# Patient Record
Sex: MALE | Race: WHITE | Employment: FULL TIME | URBAN - NONMETROPOLITAN AREA
[De-identification: names, ages, dates, MRNs, and addresses within clinical notes are randomized per-mention and may not be internally consistent; named-entity substitution may affect disease eponyms.]

---
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
## 2022-04-15 ENCOUNTER — TELEPHONE (OUTPATIENT)
Dept: BEHAVIORAL/MENTAL HEALTH | Age: 22
End: 2022-04-15

## 2022-05-26 ENCOUNTER — HOSPITAL ENCOUNTER (EMERGENCY)
Age: 22
Discharge: HOME | End: 2022-05-27
Attending: EMERGENCY MEDICINE
Payer: COMMERCIAL

## 2022-05-26 DIAGNOSIS — F41.0 ANXIETY ATTACK: Primary | ICD-10-CM

## 2022-05-26 DIAGNOSIS — R55 SYNCOPE, UNSPECIFIED SYNCOPE TYPE: ICD-10-CM

## 2022-05-26 PROCEDURE — 93005 ELECTROCARDIOGRAM TRACING: CPT | Performed by: EMERGENCY MEDICINE

## 2022-05-26 PROCEDURE — 6370000000 HC RX 637 (ALT 250 FOR IP): Performed by: EMERGENCY MEDICINE

## 2022-05-26 PROCEDURE — 99283 EMERGENCY DEPT VISIT LOW MDM: CPT

## 2022-05-26 RX ORDER — HYDROXYZINE PAMOATE 25 MG/1
25 CAPSULE ORAL 3 TIMES DAILY PRN
Qty: 15 CAPSULE | Refills: 0 | Status: ON HOLD | OUTPATIENT
Start: 2022-05-26 | End: 2022-08-08 | Stop reason: HOSPADM

## 2022-05-26 RX ORDER — LORAZEPAM 1 MG/1
1 TABLET ORAL ONCE
Status: COMPLETED | OUTPATIENT
Start: 2022-05-27 | End: 2022-05-26

## 2022-05-26 RX ADMIN — LORAZEPAM 1 MG: 1 TABLET ORAL at 23:56

## 2022-05-26 ASSESSMENT — PAIN DESCRIPTION - DESCRIPTORS: DESCRIPTORS: ACHING

## 2022-05-26 ASSESSMENT — PAIN DESCRIPTION - ORIENTATION: ORIENTATION: LEFT

## 2022-05-26 ASSESSMENT — PAIN - FUNCTIONAL ASSESSMENT: PAIN_FUNCTIONAL_ASSESSMENT: 0-10

## 2022-05-26 ASSESSMENT — PAIN DESCRIPTION - LOCATION: LOCATION: SHOULDER

## 2022-05-26 ASSESSMENT — PAIN SCALES - GENERAL: PAINLEVEL_OUTOF10: 4

## 2022-05-26 ASSESSMENT — PAIN DESCRIPTION - FREQUENCY: FREQUENCY: CONTINUOUS

## 2022-05-26 NOTE — Clinical Note
Kelly Romero was seen and treated in our emergency department on 5/26/2022. He may return to work on 05/28/2022. If you have any questions or concerns, please don't hesitate to call.       Kellie Hernandez MD

## 2022-05-27 VITALS
WEIGHT: 260 LBS | TEMPERATURE: 96.9 F | SYSTOLIC BLOOD PRESSURE: 135 MMHG | HEART RATE: 75 BPM | HEIGHT: 72 IN | RESPIRATION RATE: 15 BRPM | BODY MASS INDEX: 35.21 KG/M2 | OXYGEN SATURATION: 97 % | DIASTOLIC BLOOD PRESSURE: 70 MMHG

## 2022-05-27 LAB
EKG ATRIAL RATE: 70 BPM
EKG P AXIS: 8 DEGREES
EKG P-R INTERVAL: 138 MS
EKG Q-T INTERVAL: 368 MS
EKG QRS DURATION: 90 MS
EKG QTC CALCULATION (BAZETT): 397 MS
EKG R AXIS: 43 DEGREES
EKG T AXIS: 44 DEGREES
EKG VENTRICULAR RATE: 70 BPM

## 2022-05-27 PROCEDURE — 93010 ELECTROCARDIOGRAM REPORT: CPT | Performed by: INTERNAL MEDICINE

## 2022-05-27 NOTE — ED NOTES
Pt sitting up slightly at this time per request.  Respiratory rate and breathing has improved. He expressed he is feeling slightly better but still avoiding eye contact but does speak with staff. Tembling resolving. Denies needs this time.       Maegan Looney RN  05/27/22 7085
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
## 2022-05-27 NOTE — ED NOTES
Educated on breathing techniques  And anxiety management. Father at bedside and brother for emotional support. Pt expressed tingling In hands is improving at this time. Med administered. Education complete. Will continue to monitor. Lights dimmed and cool wash cloths to forehead and neck for non pharm management of stress.  Call light in reach     Eloy Carson RN  05/27/22 0003
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
## 2022-05-27 NOTE — ED NOTES
Call to [facility] AN AFFILIATE OF [facility].  Barbara on phone with Dr Sherry Daigle RN  05/27/22 7022

## 2022-05-27 NOTE — ED NOTES
Discharge teaching and instructions for condition explained to patient and family. AVS reviewed. Went over prescriptions with patient and father. Patient voiced understanding regarding prescriptions, follow up appointments and care of self at home.  Pt discharged to home in stable condition with father       Brisa Rosales RN  05/27/22 7044

## 2022-05-27 NOTE — ED NOTES
To room for private conversation with pt. Family to lobjenny. Pt noted to have 3 scars on left arm appearing to be cut marks which he admitted he had done one some time ago and the other 2 within last month. He denies relief from this. He is seeing  A counselor through [facility] whom he missed an appointment with recently. He denies being homicidal or suicidal but does deny having a good support system although he lives with family he feels \"no one listens to him\". Pt educated importance of mental health and f/u. Verbalized understanding. He did agree to talk to [facility] AN [facility]FFIL[facility]ATE OF [facility] if we got them on the video chat.      Alejandra Watt RN  05/27/22 [address] f [address], RN  05/27/22 4756

## 2022-08-04 ENCOUNTER — HOSPITAL ENCOUNTER (INPATIENT)
Age: 22
LOS: 4 days | Discharge: HOME | DRG: 885 | End: 2022-08-08
Attending: STUDENT IN AN ORGANIZED HEALTH CARE EDUCATION/TRAINING PROGRAM | Admitting: PSYCHIATRY & NEUROLOGY
Payer: COMMERCIAL

## 2022-08-04 DIAGNOSIS — R45.851 SUICIDAL IDEATION: Primary | ICD-10-CM

## 2022-08-04 PROBLEM — F32.9 MDD (MAJOR DEPRESSIVE DISORDER), SINGLE EPISODE: Status: ACTIVE | Noted: 2022-08-04

## 2022-08-04 LAB
ACETAMINOPHEN LEVEL: < 5 UG/ML (ref 0–20)
ALBUMIN SERPL-MCNC: 4.6 G/DL (ref 3.5–5.1)
ALP BLD-CCNC: 126 U/L (ref 38–126)
ALT SERPL-CCNC: 29 U/L (ref 11–66)
AMPHETAMINE+METHAMPHETAMINE URINE SCREEN: NEGATIVE
ANION GAP SERPL CALCULATED.3IONS-SCNC: 13 MEQ/L (ref 8–16)
AST SERPL-CCNC: 24 U/L (ref 5–40)
BARBITURATE QUANTITATIVE URINE: NEGATIVE
BASOPHILS # BLD: 0.5 %
BASOPHILS ABSOLUTE: 0 THOU/MM3 (ref 0–0.1)
BENZODIAZEPINE QUANTITATIVE URINE: NEGATIVE
BILIRUB SERPL-MCNC: 0.2 MG/DL (ref 0.3–1.2)
BILIRUBIN URINE: NEGATIVE
BLOOD, URINE: NEGATIVE
BUN BLDV-MCNC: 11 MG/DL (ref 7–22)
CALCIUM SERPL-MCNC: 9.4 MG/DL (ref 8.5–10.5)
CANNABINOID QUANTITATIVE URINE: NEGATIVE
CHARACTER, URINE: CLEAR
CHLORIDE BLD-SCNC: 101 MEQ/L (ref 98–111)
CO2: 23 MEQ/L (ref 23–33)
COCAINE METABOLITE QUANTITATIVE URINE: NEGATIVE
COLOR: YELLOW
CREAT SERPL-MCNC: 0.8 MG/DL (ref 0.4–1.2)
EOSINOPHIL # BLD: 1.6 %
EOSINOPHILS ABSOLUTE: 0.1 THOU/MM3 (ref 0–0.4)
ERYTHROCYTE [DISTWIDTH] IN BLOOD BY AUTOMATED COUNT: 12.6 % (ref 11.5–14.5)
ERYTHROCYTE [DISTWIDTH] IN BLOOD BY AUTOMATED COUNT: 40 FL (ref 35–45)
ETHYL ALCOHOL, SERUM: < 0.01 %
GLUCOSE BLD-MCNC: 92 MG/DL (ref 70–108)
GLUCOSE URINE: NEGATIVE MG/DL
HCT VFR BLD CALC: 44.6 % (ref 42–52)
HEMOGLOBIN: 15.5 GM/DL (ref 14–18)
IMMATURE GRANS (ABS): 0.01 THOU/MM3 (ref 0–0.07)
IMMATURE GRANULOCYTES: 0.2 %
KETONES, URINE: ABNORMAL
LEUKOCYTE ESTERASE, URINE: NEGATIVE
LYMPHOCYTES # BLD: 31.7 %
LYMPHOCYTES ABSOLUTE: 1.8 THOU/MM3 (ref 1–4.8)
MCH RBC QN AUTO: 30.1 PG (ref 26–33)
MCHC RBC AUTO-ENTMCNC: 34.8 GM/DL (ref 32.2–35.5)
MCV RBC AUTO: 86.6 FL (ref 80–94)
MONOCYTES # BLD: 10.9 %
MONOCYTES ABSOLUTE: 0.6 THOU/MM3 (ref 0.4–1.3)
NITRITE, URINE: NEGATIVE
NUCLEATED RED BLOOD CELLS: 0 /100 WBC
OPIATES, URINE: NEGATIVE
OXYCODONE: NEGATIVE
PH UA: 6.5 (ref 5–9)
PHENCYCLIDINE QUANTITATIVE URINE: NEGATIVE
PLATELET # BLD: 204 THOU/MM3 (ref 130–400)
PMV BLD AUTO: 9.8 FL (ref 9.4–12.4)
POTASSIUM SERPL-SCNC: 3.7 MEQ/L (ref 3.5–5.2)
PROTEIN UA: NEGATIVE
RBC # BLD: 5.15 MILL/MM3 (ref 4.7–6.1)
SALICYLATE, SERUM: < 0.3 MG/DL (ref 2–10)
SARS-COV-2, NAAT: NOT  DETECTED
SEG NEUTROPHILS: 55.1 %
SEGMENTED NEUTROPHILS ABSOLUTE COUNT: 3.1 THOU/MM3 (ref 1.8–7.7)
SODIUM BLD-SCNC: 137 MEQ/L (ref 135–145)
SPECIFIC GRAVITY, URINE: 1.02 (ref 1–1.03)
TOTAL PROTEIN: 6.7 G/DL (ref 6.1–8)
UROBILINOGEN, URINE: 1 EU/DL (ref 0–1)
WBC # BLD: 5.6 THOU/MM3 (ref 4.8–10.8)

## 2022-08-04 PROCEDURE — 36415 COLL VENOUS BLD VENIPUNCTURE: CPT

## 2022-08-04 PROCEDURE — 80143 DRUG ASSAY ACETAMINOPHEN: CPT

## 2022-08-04 PROCEDURE — 6360000002 HC RX W HCPCS

## 2022-08-04 PROCEDURE — 80179 DRUG ASSAY SALICYLATE: CPT

## 2022-08-04 PROCEDURE — 99285 EMERGENCY DEPT VISIT HI MDM: CPT

## 2022-08-04 PROCEDURE — 80307 DRUG TEST PRSMV CHEM ANLYZR: CPT

## 2022-08-04 PROCEDURE — 93005 ELECTROCARDIOGRAM TRACING: CPT

## 2022-08-04 PROCEDURE — 80053 COMPREHEN METABOLIC PANEL: CPT

## 2022-08-04 PROCEDURE — 81003 URINALYSIS AUTO W/O SCOPE: CPT

## 2022-08-04 PROCEDURE — 1240000000 HC EMOTIONAL WELLNESS R&B

## 2022-08-04 PROCEDURE — 96372 THER/PROPH/DIAG INJ SC/IM: CPT

## 2022-08-04 PROCEDURE — 87635 SARS-COV-2 COVID-19 AMP PRB: CPT

## 2022-08-04 PROCEDURE — 85025 COMPLETE CBC W/AUTO DIFF WBC: CPT

## 2022-08-04 PROCEDURE — 82077 ASSAY SPEC XCP UR&BREATH IA: CPT

## 2022-08-04 RX ORDER — TRAZODONE HYDROCHLORIDE 50 MG/1
50 TABLET ORAL NIGHTLY PRN
Status: DISCONTINUED | OUTPATIENT
Start: 2022-08-04 | End: 2022-08-08 | Stop reason: HOSPADM

## 2022-08-04 RX ORDER — TRAZODONE HYDROCHLORIDE 50 MG/1
50 TABLET ORAL NIGHTLY PRN
Status: DISCONTINUED | OUTPATIENT
Start: 2022-08-05 | End: 2022-08-04

## 2022-08-04 RX ORDER — ACETAMINOPHEN 325 MG/1
650 TABLET ORAL EVERY 4 HOURS PRN
Status: DISCONTINUED | OUTPATIENT
Start: 2022-08-04 | End: 2022-08-08 | Stop reason: HOSPADM

## 2022-08-04 RX ORDER — POLYETHYLENE GLYCOL 3350 17 G/17G
17 POWDER, FOR SOLUTION ORAL DAILY PRN
Status: DISCONTINUED | OUTPATIENT
Start: 2022-08-04 | End: 2022-08-08 | Stop reason: HOSPADM

## 2022-08-04 RX ORDER — LORAZEPAM 2 MG/ML
1 INJECTION INTRAMUSCULAR ONCE
Status: COMPLETED | OUTPATIENT
Start: 2022-08-04 | End: 2022-08-04

## 2022-08-04 RX ORDER — HYDROXYZINE HYDROCHLORIDE 25 MG/1
50 TABLET, FILM COATED ORAL 3 TIMES DAILY PRN
Status: DISCONTINUED | OUTPATIENT
Start: 2022-08-04 | End: 2022-08-08 | Stop reason: HOSPADM

## 2022-08-04 RX ORDER — IBUPROFEN 200 MG
400 TABLET ORAL EVERY 6 HOURS PRN
Status: DISCONTINUED | OUTPATIENT
Start: 2022-08-04 | End: 2022-08-05

## 2022-08-04 RX ORDER — MAGNESIUM HYDROXIDE/ALUMINUM HYDROXICE/SIMETHICONE 120; 1200; 1200 MG/30ML; MG/30ML; MG/30ML
30 SUSPENSION ORAL EVERY 6 HOURS PRN
Status: DISCONTINUED | OUTPATIENT
Start: 2022-08-04 | End: 2022-08-08 | Stop reason: HOSPADM

## 2022-08-04 RX ADMIN — LORAZEPAM 1 MG: 2 INJECTION INTRAMUSCULAR; INTRAVENOUS at 18:53

## 2022-08-04 ASSESSMENT — SLEEP AND FATIGUE QUESTIONNAIRES
DO YOU USE A SLEEP AID: YES
AVERAGE NUMBER OF SLEEP HOURS: 5
SLEEP PATTERN: DIFFICULTY FALLING ASLEEP;DISTURBED/INTERRUPTED SLEEP;DIFFICULTY ARISING
DO YOU HAVE DIFFICULTY SLEEPING: YES

## 2022-08-04 ASSESSMENT — ENCOUNTER SYMPTOMS
VOMITING: 0
CHEST TIGHTNESS: 0
SHORTNESS OF BREATH: 1

## 2022-08-04 ASSESSMENT — PATIENT HEALTH QUESTIONNAIRE - PHQ9: SUM OF ALL RESPONSES TO PHQ QUESTIONS 1-9: 24

## 2022-08-04 ASSESSMENT — LIFESTYLE VARIABLES
HOW MANY STANDARD DRINKS CONTAINING ALCOHOL DO YOU HAVE ON A TYPICAL DAY: 3 OR 4
HOW OFTEN DO YOU HAVE A DRINK CONTAINING ALCOHOL: 2-3 TIMES A WEEK
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
## 2022-08-04 NOTE — Clinical Note
Discharge Plan[de-identified] Home with Office Follow-up   Telemetry/Cardiac Monitoring Required?: No   Bed request comments: [id number]

## 2022-08-04 NOTE — ED NOTES
Pt and vs reassessed. RR easy and unlabored. Pt resting in bed alert. EKG complete and pt medicated per MAR. No distress noted.  Family at bedside and pt stable at this time     [facility]  08/04/22 1905

## 2022-08-04 NOTE — ED TRIAGE NOTES
Pt presents to ED with brother via triage with c/o panic attack and suicidal ideation. Pt visibly shaking upon arrival and at first unable to answer questions. Brother states pt was at work when he began to have a panic attack and \"passed out three times\". Brother states pt was recently seen at ambulatory care and prescribed vistaril but has been unable to fill the prescription yet. Brother states pt has been having \"problems with his girlfriend\" which he believes caused the panic attack today. Upon further questioning, pt states he is suicidal. Pt shook his head yes when asked if he was suicidal and shook his head no when asked if he had a plan. Pt would not elaborate any further. Brother states pt has cuts to his forearm and pt states he did this in an attempt to hurt himself around a few months ago.  Pt cooperative but appears to be very anxious upon arrival. Level A paged
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
## 2022-08-05 LAB
EKG ATRIAL RATE: 86 BPM
EKG P AXIS: 37 DEGREES
EKG P-R INTERVAL: 152 MS
EKG Q-T INTERVAL: 364 MS
EKG QRS DURATION: 82 MS
EKG QTC CALCULATION (BAZETT): 435 MS
EKG R AXIS: 26 DEGREES
EKG T AXIS: 33 DEGREES
EKG VENTRICULAR RATE: 86 BPM

## 2022-08-05 PROCEDURE — 6370000000 HC RX 637 (ALT 250 FOR IP): Performed by: PSYCHIATRY & NEUROLOGY

## 2022-08-05 PROCEDURE — 1240000000 HC EMOTIONAL WELLNESS R&B

## 2022-08-05 PROCEDURE — 93010 ELECTROCARDIOGRAM REPORT: CPT | Performed by: INTERNAL MEDICINE

## 2022-08-05 RX ORDER — ESCITALOPRAM OXALATE 10 MG/1
10 TABLET ORAL DAILY
Status: DISCONTINUED | OUTPATIENT
Start: 2022-08-05 | End: 2022-08-07

## 2022-08-05 RX ADMIN — TRAZODONE HYDROCHLORIDE 50 MG: 50 TABLET ORAL at 01:12

## 2022-08-05 RX ADMIN — ESCITALOPRAM OXALATE 10 MG: 10 TABLET ORAL at 11:41

## 2022-08-05 RX ADMIN — TRAZODONE HYDROCHLORIDE 50 MG: 50 TABLET ORAL at 21:32

## 2022-08-05 ASSESSMENT — SLEEP AND FATIGUE QUESTIONNAIRES
AVERAGE NUMBER OF SLEEP HOURS: 4
DO YOU HAVE DIFFICULTY SLEEPING: YES
DO YOU USE A SLEEP AID: YES
SLEEP PATTERN: DIFFICULTY FALLING ASLEEP;DIFFICULTY ARISING

## 2022-08-05 ASSESSMENT — PATIENT HEALTH QUESTIONNAIRE - PHQ9: SUM OF ALL RESPONSES TO PHQ QUESTIONS 1-9: 20

## 2022-08-05 ASSESSMENT — PAIN SCALES - GENERAL
PAINLEVEL_OUTOF10: 0

## 2022-08-05 NOTE — ED NOTES
Pt and vs reassessed. RR easy and unlabored. Pt resting in bed alert. Family at bedside. Pt ambulated to the bathroom to provide urine sample. No distress noted. Pt states he is feeling much better after receiving ativan.  Pt stable at this time and denies any needs     Dg Schmidt RN  08/04/22 2001

## 2022-08-05 NOTE — ED NOTES
Pt resting in bed alert. Family at bedside. RR easy and unlabored. No distress noted.  Pt stable at this time and denies any needs     Mike Rushing RN  08/04/22 2051

## 2022-08-05 NOTE — PLAN OF CARE
Problem: Discharge Planning  Goal: Discharge to home or other facility with appropriate resources  Outcome: Progressing  Flowsheets (Taken 8/5/2022 0800)  Discharge to home or other facility with appropriate resources: Identify barriers to discharge with patient and caregiver  Note: Patient voices no needs before discharge. Patient plans to be discharged to home with girlfriend. Discharge planner working with patient to achieve optimal discharge plans, specific to individual needs. Problem: Anxiety  Goal: Will report anxiety at manageable levels  Description: INTERVENTIONS:  1. Administer medication as ordered  2. Teach and rehearse alternative coping skills  3. Provide emotional support with 1:1 interaction with staff  Outcome: Progressing  Flowsheets (Taken 8/5/2022 0800)  Will report anxiety at manageable levels: Provide emotional support with 1:1 interaction with staff  Note: Patient denies anxiety so far this shift. Problem: Depression/Self Harm  Goal: Effect of psychiatric condition will be minimized and patient will be protected from self harm  Description: INTERVENTIONS:  1. Assess impact of patient's symptoms on level of functioning, self care needs and offer support as indicated  2. Assess patient/family knowledge of depression, impact on illness and need for teaching  3. Provide emotional support, presence and reassurance  4. Assess for possible suicidal thoughts or ideation. If patient expresses suicidal thoughts or statements do not leave alone, initiate Suicide Precautions, move to a room close to the nursing station and obtain sitter  5.  Initiate consults as appropriate with Mental Health Professional, Spiritual Care, Psychosocial CNS, and consider a recommendation to the LIP for a Psychiatric Consultation  Outcome: Progressing  Flowsheets  Taken 8/5/2022 1216  Effect of psychiatric condition will be minimized and patient will be protected from self harm:   Assess impact of patients symptoms on level of functioning, self care needs and offer support as indicated   Assess patient/family knowledge of depression, impact on illness and need for teaching  Taken 8/5/2022 0800  Effect of psychiatric condition will be minimized and patient will be protected from self harm:   Assess patient/family knowledge of depression, impact on illness and need for teaching   Provide emotional support, presence and reassurance  Note: Patient reports mood 6/10 with 10 being normal. Has flat affect, brightens with interaction. Speech clear. Good eye contact. Reports hope for future and identifies girlfriend as their support system. Problem: Sleep Disturbance  Goal: Will exhibit normal sleeping pattern  Description: INTERVENTIONS:  1. Administer medication as ordered  2. Decrease environmental stimuli, including noise, as appropriate  3. Discourage social isolation and naps during the day  Outcome: Progressing  Note: Patient slept 4.5 hours last night, reports he slept ok. Encourage patient not to take naps during the day, relax several hours before bed and to take prescribed sleep meds as ordered. Patient verbalized understanding. Care plan reviewed with patient. Patient verbalize understanding of the plan of care and contribute to goal setting.

## 2022-08-05 NOTE — PLAN OF CARE
Patient has attended at least 2 groups this shift and has also been out of his room to socialize with others today, play cards and play jenga so he has been able to identify some effective coping strategies at this time.

## 2022-08-05 NOTE — PROGRESS NOTES
Psychosocial Assessment    Current Level of Psychosocial Functioning     Independent                           XXX  Dependent    Minimal Assist     Comments:      Psychosocial High Risk Factors (check all that apply)    Unable to obtain meds   Chronic illness/pain    Substance abuse                                             XXX  Lack of Family Support   Financial stress                                                 XXX  Isolation   Inadequate Community Resources  Suicide attempt(s)  Not taking medications                                  XXX  Victim of crime   Developmental Delay  Unable to manage personal needs    Age 74 or older   Homeless  No transportation   Readmission within 30 days  Unemployment  Traumatic Event    Family/Supports identified: little brother, girlfriend, sometimes my parents     Sexual Orientation:  heterosexual     Patient Strengths: Support, employment, connection to [facility] [place] services     Patient Barriers: alcohol use     Safety plan: ongoing     CMHC/MH history:  current [facility] services     Plan of Care:  medication management, group/individual therapies, family meetings, psycho -education, treatment team meetings to assist with stabilization    Initial Discharge Plan:  will schedule follow-up upon discharge     Clinical Summary:  Patient is 25year old male who presented to the ED due to suicidal thoughts. Patient is currently living with girlfriend but states there is issues between the two right now. Patient works full time. Patient reports that he gets support from his little brother, his girl friend and sometimes his parents. Patient is currently connected with a therapist in [place]. Patient states that he drinks alcohol with friends occasionally but denies any other AOD issues. Patient reports hearing voices but states that it his own voice that he hears. Patient denies VH. Patient denies homicidal thoughts.

## 2022-08-05 NOTE — PROGRESS NOTES
Behavioral Health   Admission Note   Admission Type: Involuntary    Reason for Admission: problems with girlfriend, parents and finanical issues. Patient Strengths/Barriers  Strengths (Must Choose Two): Support from friends, Support from family, Independent living  Barriers: Motivation level for treatment, Other (comment) (financial issues)    Addictive Behavior  In the Past 3 Months, Have You Felt or Has Someone Told You That You Have a Problem With  : None    Medical Problems:   Past Medical History:   Diagnosis Date    Pyloric stenosis     when was a baby       Status EXAM:  Mental Status and Behavioral Exam  Normal: No  Level of Assistance: Independent/Self  Facial Expression: Brightened, Flat, Worried  Affect: Blunt  Level of Consciousness: Alert  Frequency of Checks: 4 times per hour, close  Mood:Normal: No  Mood: Anxious  Motor Activity:Normal: Yes  Motor Activity: Increased  Eye Contact: Fair  Observed Behavior: Cooperative  Sexual Misconduct History: Current - no  Preception: [place] to person, [place] to time, [place] to place, [place] to situation  Attention:Normal: No  Attention: Distractible, Unable to concentrate  Thought Processes: Circumstantial  Thought Content:Normal: Yes  Depression Symptoms: No problems reported or observed. Anxiety Symptoms: Generalized  [place] Symptoms: No problems reported or observed. Hallucinations: Auditory (comment) (states his own thoughts bouncing off head.)  Delusions: No  Memory:Normal: Yes  Memory: Poor recent  Insight and Judgment: No  Insight and Judgment: Poor judgment, Poor insight    Pt admitted with followings belongings:  Dental Appliances: None  Vision - Corrective Lenses: None  Hearing Aid: None  Jewelry: Bracelet, Necklace  Body Piercings Removed: No  Clothing: Undergarments  Other Valuables: At home     Admission order obtained Yes  Belongings sent home with parents.   Patient oriented to surroundings and program expectations and copy of patient rights given. Received admission packet:  Yes  Consents reviewed, signed Yes. Outcomes Questionnaire completed Yes. Patient verbalize understanding: Yes. Patient education on precautions: YES/NO/NA: yes          Patient screened positive for suicide risk on CSSR-S (\"yes\" to question #4, 5, OR 6)  yes. Physician notified of risk score. Orders received yes . 2 person skin assessment completed upon admission refused. Explained patients right to have family, representative or physician notified of their admission. Patient has Declined for physician to be notified. Patient has Declined for family/representative to be notified. Provided pt with [employer] handout entitled \"Quitting Smoking. \"  Reviewed handout with pt addressing dangers of smoking, developing coping skills, and providing basic information about quitting. Pt response to counseling:  not interested at this time. Admission summary: Arrived via wheelchair with ED staff and campus police. Pt is alert and oriented x 4. Affect flat but brightens. Pt states has been having increased anxiety with panic attacks to the point of passing out at times. Increased depression that comes and goes. Pt states he has been having relationship issues with his girlfriend and his parents. Has been having financial issues. States lives with his girlfriend in a house they rent and he goes back and forth to his house and his parents when his girlfriend needs space. Pt states having auditory hallucinations but states is his own voice bouncing back and forth off his head. No delusions noted. Denies homicidal ideations. Pt does have access to guns but parents are locking them up. Pt has scars on his left forearm states cuts every now and then but last time cut forearm a couple weeks ago. Pt states only sleeps about 4-5 hours at night. Pt declined snack but was given a drink and is resting in bed at this time.             Sofi Ontiveros RN

## 2022-08-05 NOTE — PROGRESS NOTES
Group Therapy Note    Date: 8/5/2022  Start Time: 1330  End Time:  4117  Number of Participants: 4    Type of Group: Psychotherapy    Wellness Binder Information      Notes:  Pt was present for group. Peers explored the benefiting of supportive relationships and interacting with other people who have a common experience. Peers also identified ways in which they educate those around them as to how they may be supportive and what that would look like. Status After Intervention:  Unchanged    Participation Level:  Active Listener, Interactive, and Minimal    Participation Quality: Appropriate, Attentive, and Sharing      Speech:  normal      Thought Process/Content: Logical  Linear      Affective Functioning: Flat      Mood: dysphoric      Level of consciousness:  Alert, Oriented x4, and Attentive      Response to Learning: Able to verbalize current knowledge/experience, Able to verbalize/acknowledge new learning, Able to retain information, Capable of insight, and Able to change behavior      Endings: None Reported    Modes of Intervention: Education, Support, Socialization, Exploration, and Clarifying      Discipline Responsible: /Counselor      Signature:  CHRISTIANA Sanon

## 2022-08-05 NOTE — ED PROVIDER NOTES
Transfer of Care Note:   Physician Signing out: Dr. Ambreen Sorenson  Receiving Physician: Louie Cabral MD  Sign out time: 757 pm      Brief history:  Patient states he had a panic attack after work. States that he does not want to live anymore but does not have a plan. States he feels hopeless lacks energy lacks concentration and interest in his usual activities. Items pending that need to be checked:  Labs and COVID-19      Tentative Impression of patient:  Suicidal ideation    Expected disposition of patient:  Pending results, admitted. Additional Assessment and results:   I have personally performed a face to face diagnostic evaluation on this patient. The patient's initial evaluation and plan have been discussed with the prior physician who initially evaluated the patient. Nursing Notes, Past Medical Hx, Past Surgical Hx, Social Hx, Allergies, vital signs and Family Hx were all reviewed. Vitals:    08/04/22 1849   BP: (!) 141/64   Pulse: 86   Resp: 20   Temp:    SpO2: 92%     Physical Exam  Constitutional:       Appearance: Normal appearance. HENT:      Head: Normocephalic. Neurological:      Mental Status: He is alert and oriented to person, place, and time.          Labs Reviewed   COMPREHENSIVE METABOLIC PANEL - Abnormal; Notable for the following components:       Result Value    Total Bilirubin 0.2 (*)     All other components within normal limits   URINALYSIS WITH REFLEX TO CULTURE - Abnormal; Notable for the following components:    Ketones, Urine TRACE (*)     All other components within normal limits   SALICYLATE LEVEL - Abnormal; Notable for the following components:    Salicylate, Serum < 0.3 (*)     All other components within normal limits   COVID-19, RAPID   CBC WITH AUTO DIFFERENTIAL   URINE DRUG SCREEN   ETHANOL   ACETAMINOPHEN LEVEL   ANION GAP         Medications   LORazepam (ATIVAN) injection 1 mg (1 mg IntraMUSCular Given 8/4/22 2424)         No orders to display ED Course as of 08/04/22 2226   u Aug 04, 2022   2052 CMP unremarkable CBC unremarkable  Urine negative for infection  Salicylate acetaminophen alcohol unremarkable  COVID-negative [CR]   2100 Medically cleared for admission [CR]      ED Course User Index  [CR] Edd Almendarez MD         Further MDM and disposition:   Assessment:   Suicidal ideation. [facility] paperwork performed by earlier physician. Plan:   Pending lab work and [phone]. Admission to psych if lab work shows no medical cause and if COVID is negative. Admission    Final diagnoses:   Suicidal ideation     New Prescriptions    No medications on file         Condition: condition: stable  Dispo: Admit to mental health unit - medically cleared for admission    This transcription was electronically signed. It was dictated by use of voice recognition software and electronically transcribed. The transcription may contain errors not detected in proofreading.         Edd Almendarez MD  Resident  08/04/22 5711

## 2022-08-05 NOTE — BH NOTE
Group Therapy Note    Date: 8/5/2022  Start Time: 1000  End Time:  2054  Number of Participants: 6    Type of Group: Recreational      Patient's Goal:  To increase socialization    Notes:  Pt participated in the teja and anger management activity    Status After Intervention:  Improved    Participation Level:  Active Listener and Interactive    Participation Quality: Appropriate and Sharing      Speech:  normal      Thought Process/Content: Logical      Affective Functioning: Congruent      Mood: euthymic      Level of consciousness:  Alert and Oriented x4      Response to Learning: Able to verbalize current knowledge/experience, Able to verbalize/acknowledge new learning, and Progressing to goal      Endings: None Reported    Modes of Intervention: Socialization and Activity      Discipline Responsible: Psychoeducational Specialist      Signature:  Leroy [address]

## 2022-08-05 NOTE — ED NOTES
[facility] notified of results of the C-SSRS screening and the need for further evaluation of patient. Discussed suicide precautions with patient before implementation. Patient notified that they will be observed at all times, including toileting/bathing. Visitors will be screened and may be limited at the discretion of the nurse. Visitor belongings are subject to be searched. Belongings may not be allowed into the patient room. Personal belongings checked by [facility] RN in the presence of the patient. Belongings believed to be a threat to patient safety removed from room. Room screened for safety, items removed to create a safe environment include:   Blood pressure cuff   Loose or extra cords, tubing (not of medical necessity)   Extra Linens   Telephone   Toiletries   Trash liners   Patient's cell phone and charging cord (must be removed from room)       Safety tray ordered: yes    Expectations were discussed with sitter (unit support aide). Sitter positioned near exit. Sitter reminded that patient should be observed at all times including toileting and bathing. Sitter has security radio and documentation sheet. Patient and sitter included in hourly rounds.        Mike Rushing RN  08/04/22 2019

## 2022-08-05 NOTE — ED NOTES
ED nurse-to-nurse bedside report    Chief Complaint   Patient presents with    Panic Attack    Suicidal      LOC: alert and orientated to name, place, date  Vital signs   Vitals:    08/04/22 1757 08/04/22 1849 08/04/22 2231   BP: 137/87 (!) 141/64 131/66   Pulse: 82 86 72   Resp: 18 20 18   Temp: 98.7 °F (37.1 °C)     TempSrc: Oral     SpO2: 97% 92% 98%   Weight: 250 lb (113.4 kg)     Height: 6' 2\" (1.88 m)        Pain:    Pain Interventions: none  Pain Goal: NA  Oxygen: No    Current needs required RA   Telemetry: No  LDAs:    Continuous Infusions:   Mobility: Independent  [place] Fall Risk Score: No flowsheet data found.   Fall Interventions: side rails up, call light in reach wheels lockeed, bed in lowest position  Report given to: Marco Antonio Araiza RN  08/04/22 3354

## 2022-08-05 NOTE — BH NOTE
INPATIENT RECREATIONAL THERAPY  ADULT BEHAVIORAL SERVICES  EVALUATION    REFERRING PHYSICIAN:   Dr. Ari Pretty  DIAGNOSIS:    Major Depressive Disorder, Single Episode  PRECAUTIONS:    Standard precautions    HISTORY OF PRESENT ILLNESS/INJURY:  Patient was admitted to the unit due to suicidal ideation and depression. Patient reported relationship stress with his girlfriend and his parents. Patient also reported having auditory hallucinations. Patient reported financial stress and has been having anxiety and panic attacks prior to admission. Patient has been pleasant and cooperative. PMH:  Please see medical chart for prior medical history, allergies, and medication    HISTORY OF PSYCHIATRIC TREATMENT:  OP: [facility] - counseling only at this time. YOB: 2000  GENDER:   male  MARITAL STATUS: single - Patient has a girlfriend. EMPLOYMENT STATUS:  Patient is employed. LIVING SITUATION/SUPPORT:   Lives between his girlfriend's house and his parent's house. EDUCATIONAL LEVEL:  graduate with vocational training. MEDICATION/DRUG USE:  None reported    LEISURE INTERESTS:   watching TV and movies, playing cards, listening to music, family activities, activities with his girlfriend, activities with his friends, spiritual activities  ACTIVITY PREFERENCE:  no preference  ACTIVITY TYPES:   Passive. Indoor. Outdoor. Active. COGNITION:  A&Ox4    COPING:  poor  ATTENTION:  fair  RELAXATION:  Patient reported a poor appetite, poor sleep, anxiety and panic attacks.    SELF-ESTEEM:   poor  MOTIVATION:    poor - poor insight    SOCIAL SKILLS:  good  FRUSTRATION TOLERANCE:   poor - history of cutting  ATTENTION SEEKING:  History of cutting  COOPERATION:  cooperative and pleasant  AFFECT:   brightens with interaction  APPEARANCE:  appropriate    HEARING:    no problems noted  VISION:    no problems noted   VERBAL COMMUNICATION:    no problems  WRITTEN COMMUNICATION:   no problems    COORDINATION:  No problems noted  MOBILITY:  Ambulates independently   GOALS:  Identify 2 new positive coping skills by time of discharge.

## 2022-08-05 NOTE — ED NOTES
ED to inpatient nurses report    Chief Complaint   Patient presents with    Panic Attack    Suicidal      Present to ED from home  LOC: alert and orientated to name, place, date  Vital signs   Vitals:    08/04/22 1757 08/04/22 1849   BP: 137/87 (!) 141/64   Pulse: 82 86   Resp: 18 20   Temp: 98.7 °F (37.1 °C)    TempSrc: Oral    SpO2: 97% 92%   Weight: 250 lb (113.4 kg)    Height: 6' 2\" (1.88 m)       Oxygen Baseline 99%    Current needs required RA   LDAs:    Mobility: Independent  Pending ED orders: none  Present condition: stable    C-SSRS Risk of Suicide: High Risk  Swallow Screening Pass    Electronically signed by Cassie Grant RN on 8/4/2022 at 8:54 PM       Cassie Grant RN  08/04/22 2054

## 2022-08-05 NOTE — PROGRESS NOTES
2213  Report called to Nurse Karen [facility] on 4E. Pt to be admitted.

## 2022-08-05 NOTE — PATIENT CARE CONFERENCE
[address] [facility]  Initial Interdisciplinary Treatment Plan NOTE    Review Date & Time: 8/5/2022 932    Patient was in treatment team.  See Multidisciplinary Treatment Team sheet for participants. Admission Type:   Admission Type: Involuntary    Reason for admission:  Reason for Admission: problems with girlfriend, parents and finanical issues. Estimated Length of Stay Update:  3-5 days   Estimated Discharge Date Update: 2-4 days     Patient Strengths/Barriers  Strengths (Must Choose Two): Support from friends, Support from family, Independent living  Barriers: Motivation level for treatment, Other (comment) (financial issues)  Addictive Behavior:Addictive Behavior  In the Past 3 Months, Have You Felt or Has Someone Told You That You Have a Problem With  : None  Medical Problems:  Past Medical History:   Diagnosis Date    Pyloric stenosis     when was a baby       EDUCATION:   Learner Progress Toward Treatment Goals: Reviewed results and recommendations of this team, Reviewed group plan and strategies, Reviewed signs, symptoms and risk of self harm and violent behavior, and Reviewed goals and plan of care    Method: Individual    Outcome: Verbalized understanding and Demonstrated Understanding    PATIENT GOALS: Decrease depressed feelings     PLAN/TREATMENT RECOMMENDATIONS UPDATE:   What is the most important thing we can help you with while you are here? See above   Who is your support system? Little brother, girlfriend, parents   Do you have follow-up providers? Mandi Hoffman in [place]   Do you have the ability to pay for your medications? Yes   Where will you be residing when you leave the hospital? AT home with girlfriend   Will need a return to work slip or FMLA paper completion?  Yes      GOALS UPDATE:   Time frame for Short-Term Goals: ongoing     [facility], [address]

## 2022-08-05 NOTE — ED NOTES
Pt resting in bed alert and eating at this time. RR easy and unlabored. No distress noted.  Pt stable at this time, family at [address] [facility]  08/04/22 0585

## 2022-08-06 PROBLEM — F33.2 MDD (MAJOR DEPRESSIVE DISORDER), RECURRENT SEVERE, WITHOUT PSYCHOSIS (HCC): Chronic | Status: ACTIVE | Noted: 2022-08-04

## 2022-08-06 PROBLEM — F40.01: Chronic | Status: ACTIVE | Noted: 2022-08-06

## 2022-08-06 PROCEDURE — 1240000000 HC EMOTIONAL WELLNESS R&B

## 2022-08-06 PROCEDURE — 6370000000 HC RX 637 (ALT 250 FOR IP): Performed by: PSYCHIATRY & NEUROLOGY

## 2022-08-06 RX ADMIN — ESCITALOPRAM OXALATE 10 MG: 10 TABLET ORAL at 08:22

## 2022-08-06 RX ADMIN — TRAZODONE HYDROCHLORIDE 50 MG: 50 TABLET ORAL at 21:27

## 2022-08-06 ASSESSMENT — PAIN SCALES - GENERAL
PAINLEVEL_OUTOF10: 0
PAINLEVEL_OUTOF10: 0

## 2022-08-06 NOTE — DISCHARGE INSTR - DIET

## 2022-08-06 NOTE — PLAN OF CARE
needs and offer support as indicated  2. Assess patient/family knowledge of depression, impact on illness and need for teaching  3. Provide emotional support, presence and reassurance  4. Assess for possible suicidal thoughts or ideation. If patient expresses suicidal thoughts or statements do not leave alone, initiate Suicide Precautions, move to a room close to the nursing station and obtain sitter  5. Initiate consults as appropriate with Mental Health Professional, Spiritual Care, Psychosocial CNS, and consider a recommendation to the LIP for a Psychiatric Consultation  8/5/2022 2329 by Brittnee Timmons RN  Outcome: Progressing  Flowsheets  Taken 8/5/2022 2329  Effect of psychiatric condition will be minimized and patient will be protected from self harm: Assess impact of patients symptoms on level of functioning, self care needs and offer support as indicated  Taken 8/5/2022 2328  Effect of psychiatric condition will be minimized and patient will be protected from self harm: Assess impact of patients symptoms on level of functioning, self care needs and offer support as indicated  Taken 8/5/2022 2320  Effect of psychiatric condition will be minimized and patient will be protected from self harm: Assess impact of patients symptoms on level of functioning, self care needs and offer support as indicated  Note: Patient verbalizes good understanding of needs and program. Patient noted with a blunt affect and brightens easily with interaction.   8/5/2022 1217 by Freddy Bernardo RN  Outcome: Progressing  Flowsheets  Taken 8/5/2022 1216  Effect of psychiatric condition will be minimized and patient will be protected from self harm:   Assess impact of patients symptoms on level of functioning, self care needs and offer support as indicated   Assess patient/family knowledge of depression, impact on illness and need for teaching  Taken 8/5/2022 0800  Effect of psychiatric condition will be minimized and patient will Progressing  Note: Patient states he feels his sleep pattern is progressing. 8/5/2022 1217 by Barbi Escobar RN  Outcome: Progressing  Note: Patient slept 4.5 hours last night, reports he slept ok. Encourage patient not to take naps during the day, relax several hours before bed and to take prescribed sleep meds as ordered. Patient verbalized understanding. Care plan reviewed with patient.   Patient does verbalize understanding of the plan of care and does contribute to goal setting

## 2022-08-06 NOTE — GROUP NOTE
Group Therapy Note    Date: 8/6/2022    Group Start Time: 1100  Group End Time: 1120  Group Topic: Healthy Living/Wellness    [facility]    Antonietta Infante RN        Group Therapy Note    Attendees: 11           Notes:  Met with patients to discuss the importance of self care, handouts given with ideas with positive self care activities. Status After Intervention:  Improved    Participation Level:  Active Listener    Participation Quality: Appropriate      Speech:  normal      Thought Process/Content: Logical      Affective Functioning: Congruent      Mood: anxious and depressed      Level of consciousness:  Alert      Response to Learning: Able to verbalize current knowledge/experience      Endings: None Reported    Modes of Intervention: Education      Discipline Responsible: Registered Nurse      Signature:  Antonietta Infante RN

## 2022-08-06 NOTE — PROGRESS NOTES
Daily Progress Note  Cheri Linder MD  8/6/2022    Reviewed patient's current plan of care and vital signs with nursing staff. Sleep:  7.5 hours last night broken  Attending groups: Yes  No reported Suicidal thought; good interaction with peers & staff; Mood 8 on a scale of 1 to 10 with 10 is feeling normal.     SUBJECTIVE:    Patient is feeling better. SUICIDAL IDEATION denies suicidal ideation. Patient does not have medication side effects. ROS: Patient has new complaints:  No  Sleeping adequately:  Yes  Visitors: Yes    Mental Status Examination:  Patient is cooperative. Speech: Normal rate and tone. No abnormal movements, tics or mannerisms. Mood anxious and dysthymic; affect normal affect. Suicidal ideation Absent. Homicidal ideations Absent. Hallucinations Absent. Delusions Absent. Thought Content: normal. Thought Processes: Goal-Directed. Alert and oriented X 3. Attention and concentration fair. MEMORY intact. Insight and Judgement normal insight and judgment. Data   height is 6' 2\" (1.88 m) and weight is 250 lb (113.4 kg). His tympanic temperature is 97.5 °F (36.4 °C). His blood pressure is 121/68 and his pulse is 69. His respiration is 18 and oxygen saturation is 96%.    Labs:   Admission on 08/04/2022   Component Date Value Ref Range Status    WBC 08/04/2022 5.6  4.8 - 10.8 thou/mm3 Final    RBC 08/04/2022 5.15  4.70 - 6.10 mill/mm3 Final    Hemoglobin 08/04/2022 15.5  14.0 - 18.0 gm/dl Final    Hematocrit 08/04/2022 44.6  42.0 - 52.0 % Final    MCV 08/04/2022 86.6  80.0 - 94.0 fL Final    MCH 08/04/2022 30.1  26.0 - 33.0 pg Final    MCHC 08/04/2022 34.8  32.2 - 35.5 gm/dl Final    RDW-CV 08/04/2022 12.6  11.5 - 14.5 % Final    RDW-SD 08/04/2022 40.0  35.0 - 45.0 fL Final    Platelets 44/90/3070 204  130 - 400 thou/mm3 Final    MPV 08/04/2022 9.8  9.4 - 12.4 fL Final    Seg Neutrophils 08/04/2022 55.1  % Final    Lymphocytes 08/04/2022 31.7  % Final    Monocytes 08/04/2022 10.9  % Final Eosinophils 08/04/2022 1.6  % Final    Basophils 08/04/2022 0.5  % Final    Immature Granulocytes 08/04/2022 0.2  % Final    Segs Absolute 08/04/2022 3.1  1.8 - 7.7 thou/mm3 Final    Lymphocytes Absolute 08/04/2022 1.8  1.0 - 4.8 thou/mm3 Final    Monocytes Absolute 08/04/2022 0.6  0.4 - 1.3 thou/mm3 Final    Eosinophils Absolute 08/04/2022 0.1  0.0 - 0.4 thou/mm3 Final    Basophils Absolute 08/04/2022 0.0  0.0 - 0.1 thou/mm3 Final    Immature Grans (Abs) 08/04/2022 0.01  0.00 - 0.07 thou/mm3 Final    nRBC 08/04/2022 0  /100 wbc Final    Performed at [address] [place] [id number]    Glucose 08/04/2022 92  70 - 108 mg/dL Final    Creatinine 08/04/2022 0.8  0.4 - 1.2 mg/dL Final    BUN 08/04/2022 11  7 - 22 mg/dL Final    Sodium 08/04/2022 137  135 - 145 meq/L Final    Potassium 08/04/2022 3.7  3.5 - 5.2 meq/L Final    Comment: Low level specimen hemolysis is present as indicated by the interference level  index on the Roche analyzer. The reported K+ level may be falsely increased. If clinically warranted, recollection of the specimen is suggested.       Chloride 08/04/2022 101  98 - 111 meq/L Final    CO2 08/04/2022 23  23 - 33 meq/L Final    Calcium 08/04/2022 9.4  8.5 - 10.5 mg/dL Final    AST 08/04/2022 24  5 - 40 U/L Final    Alkaline Phosphatase 08/04/2022 126  38 - 126 U/L Final    Total Protein 08/04/2022 6.7  6.1 - 8.0 g/dL Final    Albumin 08/04/2022 4.6  3.5 - 5.1 g/dL Final    Total Bilirubin 08/04/2022 0.2 (A) 0.3 - 1.2 mg/dL Final    ALT 08/04/2022 29  11 - 66 U/L Final    Performed at [address] [place] [address], [address]    Ventricular Rate 08/04/2022 86  BPM Final    Atrial Rate 08/04/2022 86  BPM Final    P-R Interval 08/04/2022 152  ms Final    QRS Duration 08/04/2022 82  ms Final    Q-T Interval 08/04/2022 364  ms Final    QTc Calculation (Bazett) 08/04/2022 435  ms Final    P Axis 08/04/2022 37  degrees Final    R Axis 08/04/2022 26  degrees Final    T [place] 08/04/2022 33  degrees Final    Glucose, Ur 08/04/2022 NEGATIVE  NEGATIVE mg/dl Final    Bilirubin Urine 08/04/2022 NEGATIVE  NEGATIVE Final    Ketones, Urine 08/04/2022 TRACE (A) NEGATIVE Final    Specific Gravity, Urine 08/04/2022 1.023  1.002 - 1.030 Final    Blood, Urine 08/04/2022 NEGATIVE  NEGATIVE Final    pH, UA 08/04/2022 6.5  5.0 - 9.0 Final    Protein, UA 08/04/2022 NEGATIVE  NEGATIVE Final    Urobilinogen, Urine 08/04/2022 1.0  0.0 - 1.0 eu/dl Final    Nitrite, Urine 08/04/2022 NEGATIVE  NEGATIVE Final    Leukocyte Esterase, Urine 08/04/2022 NEGATIVE  NEGATIVE Final    Color, UA 08/04/2022 YELLOW  STRAW-YELLOW Final    Character, Urine 08/04/2022 CLEAR  CLEAR-SL CLOUD Final    Performed at [address] [place] [address], [address]    AMPHETAMINE+METHAMPHETAMINE URINE * 08/04/2022 Negative  NEGATIVE Final    Barbiturate Quant, Ur 08/04/2022 Negative  NEGATIVE Final    Benzodiazepine Quant, Ur 08/04/2022 Negative  NEGATIVE Final    Cannabinoid Quant, Ur 08/04/2022 Negative  NEGATIVE Final    Cocaine Metab Quant, Ur 08/04/2022 Negative  NEGATIVE Final    Opiates, Urine 08/04/2022 Negative  NEGATIVE Final    Oxycodone 08/04/2022 Negative  NEGATIVE Final    PCP Quant, Ur 08/04/2022 Negative  NEGATIVE Final    Comment: A \"Negative\" result for a drug abuse screen test indicates     that the drug concentration is below the following cutoffs:            Amphetamine/Methamphetamine     1000 ng/ml            Barbiturate                      200 ng/ml            Benzodiazapine                   200 ng/ml            Cannabinoids                      50 ng/ml            Cocaine Metabolite               300 ng/ml            Opiates                          300 ng/ml            Oxycodone                        100 ng/ml            Phencyclidine                     25 ng/ml  A \"Positive\" result for a drug abuse screen test should be     considered presumptive positive until/unless confirmed     by PRN  aluminum & magnesium hydroxide-simethicone (MAALOX) 200-200-20 MG/5ML suspension 30 mL, 30 mL, Oral, Q6H PRN  traZODone (DESYREL) tablet 50 mg, 50 mg, Oral, Nightly PRN    ASSESSMENT  MDD (major depressive disorder), recurrent severe, without [facility]     PLAN  Patient's symptoms are improving  Continue with current medications. Attempt to develop insight  Psycho-education conducted. Supportive Therapy conducted.
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
## 2022-08-06 NOTE — PATIENT CARE CONFERENCE
[facility]  Day 3 Interdisciplinary Treatment Plan NOTE    Review Date & Time: 8/6/2022 911    Patient was in treatment team    Admission Type:   Admission Type: Involuntary    Reason for admission:  Reason for Admission: problems with girlfriend, parents and finanical issues. Estimated Length of Stay Update:  3-5 days   Estimated Discharge Date Update: 1-3 days     Patient Strengths/Barriers  Strengths (Must Choose Two): Support from friends, Support from family, Independent living  Barriers: Motivation level for treatment, Other (comment) (financial issues)  Addictive Behavior:Addictive Behavior  In the Past 3 Months, Have You Felt or Has Someone Told You That You Have a Problem With  : None  Medical Problems:  Past Medical History:   Diagnosis Date    Pyloric stenosis     when was a baby       Risk:  Fall Risk   Rosas Scale Rosas Scale Score: 22    Status EXAM:   Mental Status and Behavioral Exam  Normal: Yes  Level of Assistance: Independent/Self  Facial Expression: Brightened  Affect: Appropriate  Level of Consciousness: Alert  Frequency of Checks: 4 times per hour, close  Mood:Normal: Yes  Mood: Other (comment) (mood 8/10)  Motor Activity:Normal: Yes  Motor Activity: Increased  Eye Contact: Good  Observed Behavior: Friendly, Cooperative  Sexual Misconduct History: Current - no  Preception: [place] to person, [place] to time, [place] to place, [place] to situation  Attention:Normal: Yes  Attention: Distractible  Thought Processes: Circumstantial  Thought Content:Normal: Yes  Depression Symptoms: No problems reported or observed. Anxiety Symptoms: No problems reported or observed. [place] Symptoms: No problems reported or observed.   Hallucinations: None  Delusions: No  Memory:Normal: Yes  Memory: Poor recent  Insight and Judgment: No  Insight and Judgment: Poor insight    Daily Assessment Last Entry:   Daily Sleep (WDL): Within Defined Limits            Daily Nutrition (WDL): Within Defined Limits  Level of Assistance: Independent/Self    Patient Monitoring:  Frequency of Checks: 4 times per hour, close    Psychiatric Symptoms:   Depression Symptoms  Depression Symptoms: No problems reported or observed. Anxiety Symptoms  Anxiety Symptoms: No problems reported or observed. Shawnee Symptoms  [place]nee Symptoms: No problems reported or observed. Suicide Risk CSSR-S:  1) Within the past month, have you wished you were dead or wished you could go to sleep and not wake up? : Yes  2) Have you actually had any thoughts of killing yourself? : Yes  3) Have you been thinking about how you might kill yourself? : No  5) Have you started to work out or worked out the details of how to kill yourself? Do you intend to carry out this plan? : No  6) Have you ever done anything, started to do anything, or prepared to do anything to end your life?: No    EDUCATION:   Learner Progress Toward Treatment Goals: Reviewed results and recommendations of this team, Reviewed group plan and strategies, Reviewed signs, symptoms and risk of self harm and violent behavior, and Reviewed goals and plan of care    Method: Individual    Outcome: Verbalized understanding and Demonstrated Understanding    PATIENT GOALS: decrease depressed feelings    PLAN/TREATMENT RECOMMENDATIONS UPDATE:  How are you progressing toward meeting your main treatment goal?I am doing ok had some visitor last night which made me feel good. 2.  Are there discharge barriers/lingering problems that need to be addressed?none       3. Do you have the ability to pay for your medications? Yes       4. How is your group participation?   I have gone to groups     GOALS UPDATE:   Time frame for Short-Term Goals: ongoing      [facility], [facility] [address]

## 2022-08-06 NOTE — BH NOTE
Group Therapy Note    Date: 8/5/2022  Start Time: 2000  End Time:  2020  Number of Participants: 1    Type of Group: Wrap-Up/Relaxation    Wellness Binder Information  Module Name:  None  Session Number:  None    Patient's Goal:  To talk with the doctor    Notes:  Met    Status After Intervention:  Unchanged    Participation Level: Interactive    Participation Quality: Attentive      Speech:  normal      Thought Process/Content: Logical      Affective Functioning: Congruent      Mood: euthymic      Level of consciousness:  Oriented x4      Response to Learning: Capable of insight      Endings: None Reported    Modes of Intervention: Education      Discipline Responsible: Registered Nurse      Signature:   Ella Jj RN

## 2022-08-06 NOTE — PLAN OF CARE
Problem: Discharge Planning  Goal: Discharge to home or other facility with appropriate resources  8/6/2022 0938 by Antonio Corona RN  Outcome: Progressing  Flowsheets (Taken 8/6/2022 3943)  Discharge to home or other facility with appropriate resources: Identify barriers to discharge with patient and caregiver  8/5/2022 2329 by Ángel Tian RN  Outcome: Progressing  Flowsheets (Taken 8/5/2022 2320)  Discharge to home or other facility with appropriate resources: Identify barriers to discharge with patient and caregiver  Note: Patient states he plans to return home with his girlfriend at discharge and follow with his counselor at [facility]. Problem: Anxiety  Goal: Will report anxiety at manageable levels  Description: INTERVENTIONS:  1. Administer medication as ordered  2. Teach and rehearse alternative coping skills  3. Provide emotional support with 1:1 interaction with staff  8/6/2022 2253 by Antonio Corona RN  Outcome: Progressing  Flowsheets (Taken 8/6/2022 0834)  Will report anxiety at manageable levels: Teach and rehearse alternative coping skills  8/5/2022 2329 by Ángel Tian RN  Outcome: Progressing  Flowsheets  Taken 8/5/2022 2329  Will report anxiety at manageable levels: Teach and rehearse alternative coping skills  Taken 8/5/2022 2320  Will report anxiety at manageable levels: Teach and rehearse alternative coping skills  Note: Patient denies any anxiety at present. Problem: Depression/Self Harm  Goal: Effect of psychiatric condition will be minimized and patient will be protected from self harm  Description: INTERVENTIONS:  1. Assess impact of patient's symptoms on level of functioning, self care needs and offer support as indicated  2. Assess patient/family knowledge of depression, impact on illness and need for teaching  3. Provide emotional support, presence and reassurance  4. Assess for possible suicidal thoughts or ideation.  If patient expresses suicidal thoughts or statements do not leave alone, initiate Suicide Precautions, move to a room close to the nursing station and obtain sitter  5. Initiate consults as appropriate with Mental Health Professional, Spiritual Care, Psychosocial CNS, and consider a recommendation to the [facility]IP for a Psychiatric Consultation  8/6/2022 0938 by Levar Guzman RN  Outcome: Progressing  Flowsheets  Taken 8/6/2022 0937  Effect of psychiatric condition will be minimized and patient will be protected from self harm: Assess impact of patients symptoms on level of functioning, self care needs and offer support as indicated  Taken 8/6/2022 0931  Effect of psychiatric condition will be minimized and patient will be protected from self harm: Assess impact of patients symptoms on level of functioning, self care needs and offer support as indicated  8/5/2022 2329 by Tammy Hendrix RN  Outcome: Progressing  Flowsheets  Taken 8/5/2022 2329  Effect of psychiatric condition will be minimized and patient will be protected from self harm: Assess impact of patients symptoms on level of functioning, self care needs and offer support as indicated  Taken 8/5/2022 2328  Effect of psychiatric condition will be minimized and patient will be protected from self harm: Assess impact of patients symptoms on level of functioning, self care needs and offer support as indicated  Taken 8/5/2022 2320  Effect of psychiatric condition will be minimized and patient will be protected from self harm: Assess impact of patients symptoms on level of functioning, self care needs and offer support as indicated  Note: Patient verbalizes good understanding of needs and program. Patient noted with a blunt affect and brightens easily with interaction. Problem: Coping  Goal: Pt/Family able to verbalize concerns and demonstrate effective coping strategies  Description: INTERVENTIONS:  1.  Assist patient/family to identify coping skills, available support systems and cultural and spiritual values  2. Provide emotional support, including active listening and acknowledgement of concerns of patient and caregivers  3. Reduce environmental stimuli, as able  4. Instruct patient/family in relaxation techniques, as appropriate  5. Assess for spiritual pain/suffering and initiate Spiritual Care, Psychosocial Clinical Specialist consults as needed  8/6/2022 8997 by Aron Winter RN  Outcome: Progressing  Flowsheets (Taken 8/6/2022 2077)  Patient/family able to verbalize anxieties, fears, and concerns, and demonstrate effective coping: Assist patient/family to identify coping skills, available support systems and cultural and spiritual values  8/5/2022 2329 by Dolly Harvey RN  Outcome: Progressing  Flowsheets  Taken 8/5/2022 2329  Patient/family able to verbalize anxieties, fears, and concerns, and demonstrate effective coping: Assist patient/family to identify coping skills, available support systems and cultural and spiritual values  Taken 8/5/2022 2320  Patient/family able to verbalize anxieties, fears, and concerns, and demonstrate effective coping: Assist patient/family to identify coping skills, available support systems and cultural and spiritual values  Note: Patient is able to verbalizes needs and coping skills. Problem: Sleep Disturbance  Goal: Will exhibit normal sleeping pattern  Description: INTERVENTIONS:  1. Administer medication as ordered  2. Decrease environmental stimuli, including noise, as appropriate  3. Discourage social isolation and naps during the day  8/6/2022 0938 by Aron Winter RN  Outcome: Progressing  8/5/2022 2329 by Dolly Harvey RN  Outcome: Progressing  Note: Patient states he feels his sleep pattern is progressing. Care plan reviewed with patient.   Patient does verbalize understanding of the plan of care and does contribute to goal setting

## 2022-08-07 PROCEDURE — 1240000000 HC EMOTIONAL WELLNESS R&B

## 2022-08-07 PROCEDURE — 6370000000 HC RX 637 (ALT 250 FOR IP): Performed by: PSYCHIATRY & NEUROLOGY

## 2022-08-07 RX ORDER — ESCITALOPRAM OXALATE 20 MG/1
20 TABLET ORAL DAILY
Status: DISCONTINUED | OUTPATIENT
Start: 2022-08-08 | End: 2022-08-08 | Stop reason: HOSPADM

## 2022-08-07 RX ORDER — ESCITALOPRAM OXALATE 10 MG/1
10 TABLET ORAL ONCE
Status: COMPLETED | OUTPATIENT
Start: 2022-08-07 | End: 2022-08-07

## 2022-08-07 RX ADMIN — ESCITALOPRAM OXALATE 10 MG: 10 TABLET ORAL at 08:01

## 2022-08-07 RX ADMIN — TRAZODONE HYDROCHLORIDE 50 MG: 50 TABLET ORAL at 21:17

## 2022-08-07 RX ADMIN — ESCITALOPRAM OXALATE 10 MG: 10 TABLET ORAL at 16:54

## 2022-08-07 ASSESSMENT — PAIN SCALES - GENERAL
PAINLEVEL_OUTOF10: 0
PAINLEVEL_OUTOF10: 0

## 2022-08-07 NOTE — PLAN OF CARE
Problem: Discharge Planning  Goal: Discharge to home or other facility with appropriate resources  8/7/2022 0905 by Idalia Loera RN  Outcome: Progressing  Flowsheets (Taken 8/7/2022 2784)  Discharge to home or other facility with appropriate resources: Identify barriers to discharge with patient and caregiver  8/6/2022 2246 by Idania Jo RN  Outcome: Progressing  Flowsheets  Taken 8/6/2022 2246  Discharge to home or other facility with appropriate resources: Identify barriers to discharge with patient and caregiver  Taken 8/6/2022 2237  Discharge to home or other facility with appropriate resources: Identify barriers to discharge with patient and caregiver  Note: Patient states he plans to return home with his girlfriend and continue to follow with [facility]. Problem: Anxiety  Goal: Will report anxiety at manageable levels  Description: INTERVENTIONS:  1. Administer medication as ordered  2. Teach and rehearse alternative coping skills  3. Provide emotional support with 1:1 interaction with staff  8/7/2022 0905 by Idalia Loera RN  Outcome: Progressing  Flowsheets (Taken 8/7/2022 2867)  Will report anxiety at manageable levels:   Teach and rehearse alternative coping skills   Provide emotional support with 1:1 interaction with staff   Administer medication as ordered  8/6/2022 2246 by Idania Jo RN  Outcome: Progressing  Flowsheets  Taken 8/6/2022 2246  Will report anxiety at manageable levels: Teach and rehearse alternative coping skills  Taken 8/6/2022 2237  Will report anxiety at manageable levels: Teach and rehearse alternative coping skills  Note: Patient denies any anxiety at present. Problem: Depression/Self Harm  Goal: Effect of psychiatric condition will be minimized and patient will be protected from self harm  Description: INTERVENTIONS:  1. Assess impact of patient's symptoms on level of functioning, self care needs and offer support as indicated  2.  Assess patient/family knowledge of depression, impact on illness and need for teaching  3. Provide emotional support, presence and reassurance  4. Assess for possible suicidal thoughts or ideation. If patient expresses suicidal thoughts or statements do not leave alone, initiate Suicide Precautions, move to a room close to the nursing station and obtain sitter  5. Initiate consults as appropriate with Mental Health Professional, Spiritual Care, Psychosocial CNS, and consider a recommendation to the LIP for a Psychiatric Consultation  8/7/2022 0905 by Mj Gonzalez RN  Outcome: Progressing  Flowsheets  Taken 8/7/2022 0905  Effect of psychiatric condition will be minimized and patient will be protected from self harm: Assess impact of patients symptoms on level of functioning, self care needs and offer support as indicated  Taken 8/7/2022 0859  Effect of psychiatric condition will be minimized and patient will be protected from self harm: Assess impact of patients symptoms on level of functioning, self care needs and offer support as indicated  8/6/2022 2246 by Dakota Bradley RN  Outcome: Progressing  Flowsheets  Taken 8/6/2022 2246  Effect of psychiatric condition will be minimized and patient will be protected from self harm: Assess impact of patients symptoms on level of functioning, self care needs and offer support as indicated  Taken 8/6/2022 2237  Effect of psychiatric condition will be minimized and patient will be protected from self harm: Assess impact of patients symptoms on level of functioning, self care needs and offer support as indicated  Note: Patient denies any feelings of depression at present. Patient noted with a bright affect. Problem: Coping  Goal: Pt/Family able to verbalize concerns and demonstrate effective coping strategies  Description: INTERVENTIONS:  1. Assist patient/family to identify coping skills, available support systems and cultural and spiritual values  2.  Provide emotional support, including active listening and acknowledgement of concerns of patient and caregivers  3. Reduce environmental stimuli, as able  4. Instruct patient/family in relaxation techniques, as appropriate  5. Assess for spiritual pain/suffering and initiate Spiritual Care, Psychosocial Clinical Specialist consults as needed  8/7/2022 0905 by Estrada Santos RN  Outcome: Progressing  Flowsheets (Taken 8/7/2022 1222)  Patient/family able to verbalize anxieties, fears, and concerns, and demonstrate effective coping: Assist patient/family to identify coping skills, available support systems and cultural and spiritual values  8/6/2022 2246 by Malik Flores RN  Outcome: Progressing  Flowsheets  Taken 8/6/2022 2246  Patient/family able to verbalize anxieties, fears, and concerns, and demonstrate effective coping: Assist patient/family to identify coping skills, available support systems and cultural and spiritual values  Taken 8/6/2022 2237  Patient/family able to verbalize anxieties, fears, and concerns, and demonstrate effective coping: Assist patient/family to identify coping skills, available support systems and cultural and spiritual values  Note: Patient is able to verbalize needs and concerns in an appropriate manner. Problem: Sleep Disturbance  Goal: Will exhibit normal sleeping pattern  Description: INTERVENTIONS:  1. Administer medication as ordered  2. Decrease environmental stimuli, including noise, as appropriate  3. Discourage social isolation and naps during the day  8/7/2022 0905 by Estrada Santos RN  Outcome: Progressing  8/6/2022 2246 by Malik Flores RN  Outcome: Progressing  Note: Patient states he feels his sleep pattern is improving. Care plan reviewed with patient.   Patient does verbalize understanding of the plan of care and does contribute to goal setting

## 2022-08-07 NOTE — PLAN OF CARE
Problem: Discharge Planning  Goal: Discharge to home or other facility with appropriate resources  8/6/2022 2246 by Eva Cabrera RN  Outcome: Progressing  Flowsheets  Taken 8/6/2022 2246  Discharge to home or other facility with appropriate resources: Identify barriers to discharge with patient and caregiver  Taken 8/6/2022 2237  Discharge to home or other facility with appropriate resources: Identify barriers to discharge with patient and caregiver  Note: Patient states he plans to return home with his girlfriend and continue to follow with [facility]. 8/6/2022 3331 by Christian Pimentel RN  Outcome: Progressing  Flowsheets (Taken 8/6/2022 0162)  Discharge to home or other facility with appropriate resources: Identify barriers to discharge with patient and caregiver     Problem: Anxiety  Goal: Will report anxiety at manageable levels  Description: INTERVENTIONS:  1. Administer medication as ordered  2. Teach and rehearse alternative coping skills  3. Provide emotional support with 1:1 interaction with staff  8/6/2022 2246 by Eva Cabrera RN  Outcome: Progressing  Flowsheets  Taken 8/6/2022 2246  Will report anxiety at manageable levels: Teach and rehearse alternative coping skills  Taken 8/6/2022 2237  Will report anxiety at manageable levels: Teach and rehearse alternative coping skills  Note: Patient denies any anxiety at present. 8/6/2022 7456 by Christian Pimentel RN  Outcome: Progressing  Flowsheets (Taken 8/6/2022 8947)  Will report anxiety at manageable levels: Teach and rehearse alternative coping skills     Problem: Depression/Self Harm  Goal: Effect of psychiatric condition will be minimized and patient will be protected from self harm  Description: INTERVENTIONS:  1. Assess impact of patient's symptoms on level of functioning, self care needs and offer support as indicated  2. Assess patient/family knowledge of depression, impact on illness and need for teaching  3.  Provide emotional support, presence and reassurance  4. Assess for possible suicidal thoughts or ideation. If patient expresses suicidal thoughts or statements do not leave alone, initiate Suicide Precautions, move to a room close to the nursing station and obtain sitter  5. Initiate consults as appropriate with Mental Health Professional, Spiritual Care, Psychosocial CNS, and consider a recommendation to the LIP for a Psychiatric Consultation  8/6/2022 2246 by Douglas Benito RN  Outcome: Progressing  Flowsheets  Taken 8/6/2022 2246  Effect of psychiatric condition will be minimized and patient will be protected from self harm: Assess impact of patients symptoms on level of functioning, self care needs and offer support as indicated  Taken 8/6/2022 2237  Effect of psychiatric condition will be minimized and patient will be protected from self harm: Assess impact of patients symptoms on level of functioning, self care needs and offer support as indicated  Note: Patient denies any feelings of depression at present. Patient noted with a bright affect. 8/6/2022 5394 by Leola Carolina RN  Outcome: Progressing  Flowsheets  Taken 8/6/2022 7928  Effect of psychiatric condition will be minimized and patient will be protected from self harm: Assess impact of patients symptoms on level of functioning, self care needs and offer support as indicated  Taken 8/6/2022 0931  Effect of psychiatric condition will be minimized and patient will be protected from self harm: Assess impact of patients symptoms on level of functioning, self care needs and offer support as indicated     Problem: Coping  Goal: Pt/Family able to verbalize concerns and demonstrate effective coping strategies  Description: INTERVENTIONS:  1. Assist patient/family to identify coping skills, available support systems and cultural and spiritual values  2.  Provide emotional support, including active listening and acknowledgement of concerns of patient and caregivers  3. Reduce environmental stimuli, as able  4. Instruct patient/family in relaxation techniques, as appropriate  5. Assess for spiritual pain/suffering and initiate Spiritual Care, Psychosocial Clinical Specialist consults as needed  8/6/2022 2246 by Ella Jj RN  Outcome: Progressing  Flowsheets  Taken 8/6/2022 2246  Patient/family able to verbalize anxieties, fears, and concerns, and demonstrate effective coping: Assist patient/family to identify coping skills, available support systems and cultural and spiritual values  Taken 8/6/2022 2237  Patient/family able to verbalize anxieties, fears, and concerns, and demonstrate effective coping: Assist patient/family to identify coping skills, available support systems and cultural and spiritual values  Note: Patient is able to verbalize needs and concerns in an appropriate manner. 8/6/2022 0706 by Dominga Coon RN  Outcome: Progressing  Flowsheets (Taken 8/6/2022 4582)  Patient/family able to verbalize anxieties, fears, and concerns, and demonstrate effective coping: Assist patient/family to identify coping skills, available support systems and cultural and spiritual values     Problem: Sleep Disturbance  Goal: Will exhibit normal sleeping pattern  Description: INTERVENTIONS:  1. Administer medication as ordered  2. Decrease environmental stimuli, including noise, as appropriate  3. Discourage social isolation and naps during the day  8/6/2022 2246 by Ella Jj RN  Outcome: Progressing  Note: Patient states he feels his sleep pattern is improving. 8/6/2022 8095 by Dominga Coon RN  Outcome: Progressing   Care plan reviewed with patient.   Patient does verbalize understanding of the plan of care and does contribute to goal setting

## 2022-08-07 NOTE — PROGRESS NOTES
Daily Progress Note  Mame Crocker MD  8/7/2022    Reviewed patient's current plan of care and vital signs with nursing staff. Sleep:  8 hours last night   Attending groups: Yes  No reported Suicidal thought; good interaction with peers & staff; Mood 8 on a scale of 1 to 10 with 10 is feeling normal.     SUBJECTIVE:    Patient is feeling better. SUICIDAL IDEATION denies suicidal ideation. Patient does not have medication side effects. ROS: Patient has new complaints:  No  Sleeping adequately:  Yes  Visitors: Yes    Mental Status Examination:  Patient is cooperative. Speech: Normal rate and tone. No abnormal movements, tics or mannerisms. Mood euthymic; affect normal affect. Suicidal ideation Absent. Homicidal ideations Absent. Hallucinations Absent. Delusions Absent. Thought Content: normal. Thought Processes: Goal-Directed. Alert and oriented X 3. Attention and concentration fair. MEMORY intact. Insight and Judgement normal insight and judgment. Data   height is 6' 2\" (1.88 m) and weight is 250 lb (113.4 kg). His tympanic temperature is 97.7 °F (36.5 °C). His blood pressure is 131/62 and his pulse is 57. His respiration is 18 and oxygen saturation is 98%. Labs:            Medications  Current Facility-Administered Medications: escitalopram (LEXAPRO) tablet 10 mg, 10 mg, Oral, Daily  acetaminophen (TYLENOL) tablet 650 mg, 650 mg, Oral, Q4H PRN  polyethylene glycol (GLYCOLAX) packet 17 g, 17 g, Oral, Daily PRN  hydrOXYzine HCl (ATARAX) tablet 50 mg, 50 mg, Oral, TID PRN  aluminum & magnesium hydroxide-simethicone (MAALOX) 200-200-20 MG/5ML suspension 30 mL, 30 mL, Oral, Q6H PRN  traZODone (DESYREL) tablet 50 mg, 50 mg, Oral, Nightly PRN    ASSESSMENT  MDD (major depressive disorder), recurrent severe, without psychosis ([facility] [address]     PLAN  Patient's symptoms are improving  Continue with current medications, increase escitalopram.  Attempt to develop insight  Psycho-education conducted.   Supportive Therapy conducted.   Probable discharge is tomorrow  Follow-up @ [facility]

## 2022-08-07 NOTE — GROUP NOTE
Group Therapy Note    Date: 8/7/2022    Group Start Time: 0915  Group End Time: 0945  Group Topic: [facility] Adult [facility] 4E    Kyara Myrick        Group Therapy Note    Attendees: Discussed goals for today & addressed community issues as well as safety plans. Patients also participated in a suicide prevention activity. Patient's Goal:  Keep anxiety down and socialize. Notes:  Patient showed good insight with his need to socialize and keep his anxiety down. Status After Intervention:  Unchanged    Participation Level:  Active Listener and Interactive    Participation Quality: Appropriate, Attentive, and Sharing      Speech:  normal      Thought Process/Content: Logical      Affective Functioning: Congruent      Mood: euthymic      Level of consciousness:  Alert and Attentive      Response to Learning: Capable of insight, Able to change behavior, and Progressing to goal      Endings: None Reported    Modes of Intervention: Education, Support, Socialization, and Activity      Discipline Responsible: Behavorial Health Tech      Signature:  Kenny Moralez

## 2022-08-07 NOTE — BH NOTE
Group Therapy Note    Date: 8/6/2022  Start Time: 2000  End Time:  2020  Number of Participants: 1    Type of Group: Wrap-Up/Relaxation    Wellness Binder Information  Module Name:  None  Session Number:  None    Patient's Goal:  To stop worrying about bad things    Notes:  Ongoing    Status After Intervention:  Unchanged    Participation Level: Interactive    Participation Quality: Appropriate      Speech:  normal      Thought Process/Content: Logical      Affective Functioning: Congruent      Mood: euthymic      Level of consciousness:  Oriented x4      Response to Learning: Able to retain information      Endings: None Reported    Modes of Intervention: Education      Discipline Responsible: Registered Nurse      Signature:   Latisha Pickett RN

## 2022-08-07 NOTE — GROUP NOTE
Group Therapy Note    Date: 8/7/2022    Group Start Time: 1030  Group End Time: 6638  Group Topic: Recreational    [facility] Adult Psych 4E    Kyara Myrick        Group Therapy Note    Attendees: Patients played the game \"Apples to Apples\". Patient's Goal:  Keep anxiety down and socialize.     Notes:  Patient has done well progressing toward his goal.    Status After Intervention:  Improved    Participation Level: Interactive    Participation Quality: Appropriate      Speech:  normal      Thought Process/Content: Logical      Affective Functioning: Congruent      Mood: euthymic      Level of consciousness:  Attentive      Response to Learning: Progressing to goal      Endings: None Reported    Modes of Intervention: Activity      Discipline Responsible: [employer]      Signature:  Nelida Mckeon

## 2022-08-08 VITALS
RESPIRATION RATE: 16 BRPM | DIASTOLIC BLOOD PRESSURE: 76 MMHG | WEIGHT: 250 LBS | TEMPERATURE: 98 F | OXYGEN SATURATION: 100 % | SYSTOLIC BLOOD PRESSURE: 128 MMHG | HEIGHT: 74 IN | BODY MASS INDEX: 32.08 KG/M2 | HEART RATE: 74 BPM

## 2022-08-08 PROCEDURE — 5130000000 HC BRIDGE APPOINTMENT

## 2022-08-08 PROCEDURE — 6370000000 HC RX 637 (ALT 250 FOR IP): Performed by: PSYCHIATRY & NEUROLOGY

## 2022-08-08 RX ORDER — TRAZODONE HYDROCHLORIDE 50 MG/1
50 TABLET ORAL NIGHTLY PRN
Qty: 30 TABLET | Refills: 0 | Status: SHIPPED | OUTPATIENT
Start: 2022-08-08

## 2022-08-08 RX ORDER — ESCITALOPRAM OXALATE 20 MG/1
20 TABLET ORAL DAILY
Qty: 30 TABLET | Refills: 0 | Status: SHIPPED | OUTPATIENT
Start: 2022-08-09

## 2022-08-08 RX ORDER — HYDROXYZINE 50 MG/1
50 TABLET, FILM COATED ORAL 3 TIMES DAILY PRN
Qty: 90 TABLET | Refills: 0 | Status: SHIPPED | OUTPATIENT
Start: 2022-08-08 | End: 2022-09-07

## 2022-08-08 RX ADMIN — ESCITALOPRAM 20 MG: 20 TABLET, FILM COATED ORAL at 08:45

## 2022-08-08 ASSESSMENT — PAIN SCALES - GENERAL: PAINLEVEL_OUTOF10: 0

## 2022-08-08 NOTE — PROGRESS NOTES
Daily Progress Note  Donya Murrieta MD  8/8/2022    Reviewed patient's current plan of care and vital signs with nursing staff. Sleep:  8 hours last night   Attending groups: Yes  No reported Suicidal thought; good interaction with peers & staff; Mood 8 on a scale of 1 to 10 with 10 is feeling normal.  He feels ready to go home. SUBJECTIVE:    Patient is feeling better. SUICIDAL IDEATION denies suicidal ideation. Patient does not have medication side effects. ROS: Patient has new complaints:  No  Sleeping adequately:  Yes  Visitors: Yes    Mental Status Examination:  Patient is cooperative. Speech: Normal rate and tone. No abnormal movements, tics or mannerisms. Mood euthymic; affect normal affect. Suicidal ideation Absent. Homicidal ideations Absent. Hallucinations Absent. Delusions Absent. Thought Content: normal. Thought Processes: Goal-Directed. Alert and oriented X 3. Attention and concentration fair. MEMORY intact. Insight and Judgement normal insight and judgment. Data   height is 6' 2\" (1.88 m) and weight is 250 lb (113.4 kg). His tympanic temperature is 99.3 °F (37.4 °C). His blood pressure is 117/58 (abnormal) and his pulse is 69. His respiration is 18 and oxygen saturation is 100%. Labs:            Medications  Current Facility-Administered Medications: escitalopram (LEXAPRO) tablet 20 mg, 20 mg, Oral, Daily  acetaminophen (TYLENOL) tablet 650 mg, 650 mg, Oral, Q4H PRN  polyethylene glycol (GLYCOLAX) packet 17 g, 17 g, Oral, Daily PRN  hydrOXYzine HCl (ATARAX) tablet 50 mg, 50 mg, Oral, TID PRN  aluminum & magnesium hydroxide-simethicone (MAALOX) 200-200-20 MG/5ML suspension 30 mL, 30 mL, Oral, Q6H PRN  traZODone (DESYREL) tablet 50 mg, 50 mg, Oral, Nightly PRN    ASSESSMENT  MDD (major depressive disorder), recurrent severe, without psychosis ([facility] [address]     PLAN  Patient's symptoms are improving  Continue with current medications.   Attempt to develop insight  Psycho-education conducted. Supportive Therapy conducted. Probable discharge is today  Follow-up @ [address] [facility] for med somatic and his therapist at [facility].

## 2022-08-08 NOTE — PLAN OF CARE
Problem: Discharge Planning  Goal: Discharge to home or other facility with appropriate resources  8/7/2022 2252 by Yesi Sanchez RN  Outcome: Progressing  Flowsheets  Taken 8/7/2022 2252  Discharge to home or other facility with appropriate resources: Identify barriers to discharge with patient and caregiver  Taken 8/7/2022 2241  Discharge to home or other facility with appropriate resources: Identify barriers to discharge with patient and caregiver  Note: Patient states he plans to return home with his girlfriend at discharge and follow up with Vijay Samson at [facility]. 8/7/2022 0905 by Nima Jacobo RN  Outcome: Progressing  Flowsheets (Taken 8/7/2022 9382)  Discharge to home or other facility with appropriate resources: Identify barriers to discharge with patient and caregiver     Problem: Anxiety  Goal: Will report anxiety at manageable levels  Description: INTERVENTIONS:  1. Administer medication as ordered  2. Teach and rehearse alternative coping skills  3. Provide emotional support with 1:1 interaction with staff  8/7/2022 2252 by Yesi Sanchez RN  Outcome: Progressing  Flowsheets  Taken 8/7/2022 2252  Will report anxiety at manageable levels: Teach and rehearse alternative coping skills  Taken 8/7/2022 2241  Will report anxiety at manageable levels: Teach and rehearse alternative coping skills  Note: Patient states some anxiety about being discharged tomorrow. Patient declined to need for medication. 8/7/2022 0905 by Nima Jacobo RN  Outcome: Progressing  Flowsheets (Taken 8/7/2022 3195)  Will report anxiety at manageable levels:   Teach and rehearse alternative coping skills   Provide emotional support with 1:1 interaction with staff   Administer medication as ordered     Problem: Depression/Self Harm  Goal: Effect of psychiatric condition will be minimized and patient will be protected from self harm  Description: INTERVENTIONS:  1.  Assess impact of patient's symptoms on level of functioning, self care needs and offer support as indicated  2. Assess patient/family knowledge of depression, impact on illness and need for teaching  3. Provide emotional support, presence and reassurance  4. Assess for possible suicidal thoughts or ideation. If patient expresses suicidal thoughts or statements do not leave alone, initiate Suicide Precautions, move to a room close to the nursing station and obtain sitter  5. Initiate consults as appropriate with Mental Health Professional, Spiritual Care, Psychosocial CNS, and consider a recommendation to the LIP for a Psychiatric Consultation  8/7/2022 2252 by Clara Garcia RN  Outcome: Progressing  Flowsheets  Taken 8/7/2022 2252  Effect of psychiatric condition will be minimized and patient will be protected from self harm: Assess impact of patients symptoms on level of functioning, self care needs and offer support as indicated  Taken 8/7/2022 2241  Effect of psychiatric condition will be minimized and patient will be protected from self harm: Assess impact of patients symptoms on level of functioning, self care needs and offer support as indicated  Note: Patient denies any feelings of depression at present. Patient noted with a blunt affect and brightens easily with interaction. No self harm noted so far this shift.   8/7/2022 0905 by Wilma Sun RN  Outcome: Progressing  Flowsheets  Taken 8/7/2022 0905  Effect of psychiatric condition will be minimized and patient will be protected from self harm: Assess impact of patients symptoms on level of functioning, self care needs and offer support as indicated  Taken 8/7/2022 0859  Effect of psychiatric condition will be minimized and patient will be protected from self harm: Assess impact of patients symptoms on level of functioning, self care needs and offer support as indicated     Problem: Coping  Goal: Pt/Family able to verbalize concerns and demonstrate effective coping strategies  Description: INTERVENTIONS:  1. Assist patient/family to identify coping skills, available support systems and cultural and spiritual values  2. Provide emotional support, including active listening and acknowledgement of concerns of patient and caregivers  3. Reduce environmental stimuli, as able  4. Instruct patient/family in relaxation techniques, as appropriate  5. Assess for spiritual pain/suffering and initiate Spiritual Care, Psychosocial Clinical Specialist consults as needed  8/7/2022 2252 by Antonette Marti RN  Outcome: Progressing  Flowsheets  Taken 8/7/2022 2252  Patient/family able to verbalize anxieties, fears, and concerns, and demonstrate effective coping: Assist patient/family to identify coping skills, available support systems and cultural and spiritual values  Taken 8/7/2022 2241  Patient/family able to verbalize anxieties, fears, and concerns, and demonstrate effective coping: Assist patient/family to identify coping skills, available support systems and cultural and spiritual values  Note: Patient is able to verbalize needs and concerns in an appropriate manner. 8/7/2022 0905 by Rosy Razo RN  Outcome: Progressing  Flowsheets (Taken 8/7/2022 6809)  Patient/family able to verbalize anxieties, fears, and concerns, and demonstrate effective coping: Assist patient/family to identify coping skills, available support systems and cultural and spiritual values     Problem: Sleep Disturbance  Goal: Will exhibit normal sleeping pattern  Description: INTERVENTIONS:  1. Administer medication as ordered  2. Decrease environmental stimuli, including noise, as appropriate  3. Discourage social isolation and naps during the day  8/7/2022 2252 by Antonette Marti RN  Outcome: Progressing  Note: Patient states he feels he is sleeping well. 8/7/2022 0905 by Rosy Razo RN  Outcome: Progressing   Care plan reviewed with patient.   Patient does verbalize understanding of the plan of care and does contribute to goal setting

## 2022-08-08 NOTE — PROGRESS NOTES
Comprehensive Nutrition Assessment    Type and Reason for Visit:  Initial, Positive Nutrition Screen (unintentional weight loss and decreased appetite)    Nutrition Recommendations/Plan:   Continue diet as ordered and encourage PO intake at best efforts. Discontinued ONS as pt reports does not like. Monitor PO intake and provide further nutrition recommendations as necessary. Malnutrition Assessment:  Malnutrition Status:  at risk for malnutrition (08/08/22 1135)    Context:  Acute Illness     Findings of the 6 clinical characteristics of malnutrition:  Energy Intake:  75% or less of estimated energy requirements for 7 or more days  Weight Loss:   (per pt report he has lost a total of 25 lbs)     Body Fat Loss:  No significant body fat loss     Muscle Mass Loss:  No significant muscle mass loss    Fluid Accumulation:  No significant fluid accumulation     Strength:  Not Performed    Nutrition Assessment:     Pt. nutritionally compromised AEB report of weight loss and decreased intake PTA. At risk for further nutrition compromise r/t admit d/t panic attacks and suicide with ideation; per EMR pt was having some issues with his girlfriend and underlying medical condition (PMH: pyloric stenosis when he was a baby). Nutrition Related Findings:      Wound Type: None     Pt. Report/Treatments/Miscellaneous: pt improving as he reports appetite and intake much better than what it had been PTA; pt reports he does not like the supplements and has been giving them to other people on the unit; pt reports that he is feeling well. GI Status: no problems  Pertinent Labs: N/A  Pertinent Meds: lexapro     Current Nutrition Intake & Therapies:    Average Meal Intake: %  Average Supplements Intake: Refusing to take (pt reports he dislikes the ONS)  ADULT DIET;  Regular    Anthropometric Measures:  Height: 6' 2\" (188 cm)  Ideal Body Weight (IBW): 190 lbs (86 kg)       Current Body Weight: 250 lb (113.4 kg), IBW. Weight Source: Stated  Current BMI (kg/m2): 32.1  Usual Body Weight:  (pt reports  lbs)                       BMI Categories: Obese Class 1 (BMI 30.0-34. 9)    Estimated Daily Nutrient Needs:  Energy Requirements Based On: Kcal/kg  Weight Used for Energy Requirements: Current  Energy (kcal/day): 8939-4925 kcals/day (15-18 kcals/kg)  Weight Used for Protein Requirements: Ideal  Protein (g/day):  g/day (1.1-1.2 g/kg IBW)    Nutrition Diagnosis:   Inadequate protein-energy intake related to psychological cause or life stress, early satiety as evidenced by poor intake prior to admission, weight loss    Nutrition Interventions:   Food and/or Nutrient Delivery: Continue Current Diet, Discontinue Oral Nutrition Supplement  Nutrition Education/Counseling: Education initiated (Encourage PO intake at best efforts)  Coordination of Nutrition Care: Continue to monitor while inpatient       Goals:     Goals: Meet at least 75% of estimated needs, by next RD assessment       Nutrition Monitoring and Evaluation:      Food/Nutrient Intake Outcomes: Food and Nutrient Intake, Diet Advancement/Tolerance  Physical Signs/Symptoms Outcomes: Biochemical Data, Meal Time Behavior, GI Status, Nutrition Focused Physical Findings, Weight, Skin    Discharge Planning:    No discharge needs at this time     Seth Quijano RD  Contact: [phone]

## 2022-08-08 NOTE — BH NOTE
Group Therapy Note    Date: 8/7/2022  Start Time: 2000  End Time:  2020  Number of Participants: 1    Type of Group: Wrap-Up/Relaxation    Wellness Binder Information  Module Name:  None  Session Number:  None    Patient's Goal:  To keep my anxiety down and socialize    Notes:  Ongoing    Status After Intervention:  Unchanged    Participation Level: Interactive    Participation Quality: Attentive      Speech:  hesitant      Thought Process/Content: Logical      Affective Functioning: Congruent      Mood: anxious      Level of consciousness:  Oriented x4      Response to Learning: Able to retain information      Endings: None Reported    Modes of Intervention: Education      Discipline Responsible: Registered Nurse      Signature:   Xena Tanner RN

## 2022-08-08 NOTE — BH NOTE
Behavioral Health   Discharge Note    Pt discharged with followings belongings:   Dental Appliances: None  Vision - Corrective Lenses: None  Hearing Aid: None  Jewelry: Bracelet, Necklace  Body Piercings Removed: No  Clothing: Undergarments  Other Valuables: At home   Valuables retrieved from safe, security envelope number:  na   and returned to patient. Patient left department with girlfriend via private car. Discharged to home. \"An Important Message from Medicare About Your Rights\" (IMM) form photocopy original from admission and provided to pt at least 4 hours prior to discharge N/A. If pt left within 4 hours of receiving 2nd delivery of IMM, this is because pt was agreeable with hospital discharge. Patient/guardian education on aftercare instructions: Yes  Bridge appointment completed:  yes. Reviewed Discharge Instructions with patient/family/nursing facility. Patient/family verbalizes understanding and agreement with the discharge plan using the teachback method. Patient/family verbalize understanding of AVS:Yes    Status EXAM upon discharge:  Mental Status and Behavioral Exam  Normal: Yes  Level of Assistance: Independent/Self  Facial Expression: Brightened  Affect: Appropriate  Level of Consciousness: Alert  Frequency of Checks: 4 times per hour, close  Mood:Normal: Yes  Mood: Anxious  Motor Activity:Normal: Yes  Motor Activity: Increased  Eye Contact: Good  Observed Behavior: Cooperative  Sexual Misconduct History: Current - no  Preception: [place] to person, [place] to time, [place] to place, [place] to situation  Attention:Normal: Yes  Attention: Distractible  Thought Processes: Other (comment) (logical)  Thought Content:Normal: Yes  Depression Symptoms: No problems reported or observed. Anxiety Symptoms: No problems reported or observed. [place] Symptoms: No problems reported or observed.   Hallucinations: None  Delusions: No  Memory:Normal: Yes  Memory: Poor recent  Insight and Judgment: Yes  Insight and Judgment: Poor insight    Dominique Graham RN
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
## 2022-08-09 ENCOUNTER — TELEPHONE (OUTPATIENT)
Dept: PSYCHIATRY | Age: 22
End: 2022-08-09

## 2025-04-28 NOTE — DISCHARGE INSTRUCTIONS
[employer] Hotline:  [phone]    Crisis phone numbers:  [employer], and [place] [employer] [employer] [facility] [phone]. [facility] and [facility] [address] [facility] [phone]. [facility] [phone]. [address] [place] [place], and [place] [phone]. [facility]  [address] [facility], [address]  [address]  [address] [facility]  [facility] [address]  [address]  [place], [address]  [place], [address]  [address]    [facility] [address]  [address]  [address] [facility] [address] [facility], [address]  [address] [facility]  Recovery and [address]  [address], [facility]  [phone]    [facility]  [address]  [address] [facility],[address], [address]  [address] [facility] [address]  [place], [address]  [facility]  [address]  [address], [address] [facility]  Recovery and [address]  [address], [address] [place]  [phone]    [facility]  [address], [address]  [address]  [address]   [address]  [address] [facility]  [address]  [place], [address]  [address]  [address]  [facility] 163   [place], [address]  [phone] Duration Of Freeze Thaw-Cycle (Seconds): 5 Number Of Freeze-Thaw Cycles: 1 freeze-thaw cycle Render Post-Care Instructions In Note?: no Total Number Of Aks Treated: 9 Consent: The patient's consent was obtained including but not limited to risks of crusting, blistering, scarring, pigmentary change. Detail Level: Zone Post-Care Instructions: Pt may apply Vaseline to crusted or scabbing areas.